# Patient Record
Sex: FEMALE | URBAN - METROPOLITAN AREA
[De-identification: names, ages, dates, MRNs, and addresses within clinical notes are randomized per-mention and may not be internally consistent; named-entity substitution may affect disease eponyms.]

---

## 2021-12-08 ENCOUNTER — APPOINTMENT (RX ONLY)
Dept: URBAN - METROPOLITAN AREA CLINIC 148 | Facility: CLINIC | Age: 46
Setting detail: DERMATOLOGY
End: 2021-12-08

## 2021-12-08 DIAGNOSIS — Z41.9 ENCOUNTER FOR PROCEDURE FOR PURPOSES OTHER THAN REMEDYING HEALTH STATE, UNSPECIFIED: ICD-10-CM

## 2021-12-08 DIAGNOSIS — L98.8 OTHER SPECIFIED DISORDERS OF THE SKIN AND SUBCUTANEOUS TISSUE: ICD-10-CM

## 2021-12-08 PROCEDURE — ? BOTOX (U OR CC)

## 2021-12-08 PROCEDURE — ? COSMETIC CONSULTATION: BOTOX

## 2021-12-08 PROCEDURE — ? ADDITIONAL NOTES

## 2021-12-08 NOTE — HPI: COSMETIC (BOTOX)
Have You Had Botox Before?: has had botox
When Was Your Last Botox Treatment?: 12/2020
Additional History: Patient has had Botox in the past multiple times, last treatment was around a year ago. Patient reports one time she was treated the ends of her eyebrows lifted which she did not like and one time she experienced a heavy brow. All of which resolved with retreatment and or time. Patient reports she is currently taking Valtrex for oral herpes sore.

## 2021-12-08 NOTE — PROCEDURE: ADDITIONAL NOTES
Additional Notes: Holiday Event pricing honored today.
Render Risk Assessment In Note?: no
Detail Level: Simple

## 2021-12-08 NOTE — PROCEDURE: BOTOX (U OR CC)
Post-Care Instructions: Post care instructions reviewed with patient today and all questions answered to patient's satisfaction. Patient provided signed copy of discharge instructions.

## 2021-12-08 NOTE — PROCEDURE: BOTOX (U OR CC)
Consent: Written consent obtained. Risks include but not limited to lid/brow ptosis, bruising, swelling, diplopia, temporary effect, incomplete chemical denervation. Areas prepped with alcohol and chlorhexidine. Areas treated without complication, patient tolerated well.

## 2021-12-29 ENCOUNTER — APPOINTMENT (RX ONLY)
Dept: URBAN - METROPOLITAN AREA CLINIC 148 | Facility: CLINIC | Age: 46
Setting detail: DERMATOLOGY
End: 2021-12-29

## 2021-12-29 DIAGNOSIS — Z41.9 ENCOUNTER FOR PROCEDURE FOR PURPOSES OTHER THAN REMEDYING HEALTH STATE, UNSPECIFIED: ICD-10-CM

## 2021-12-29 DIAGNOSIS — L98.8 OTHER SPECIFIED DISORDERS OF THE SKIN AND SUBCUTANEOUS TISSUE: ICD-10-CM

## 2021-12-29 PROCEDURE — ? ADDITIONAL NOTES

## 2021-12-29 PROCEDURE — ? BOTOX (U OR CC)

## 2021-12-29 PROCEDURE — ? COSMETIC FOLLOW-UP

## 2021-12-29 NOTE — PROCEDURE: BOTOX (U OR CC)
Post-Care Instructions: Post care instructions reviewed with patient today and all questions answered to patient's satisfaction.

## 2021-12-29 NOTE — HPI: COSMETIC FOLLOW UP
How Did You Tolerate The Procedure?: well, without problems
What Condition Are We Treating?: wrinkles
What Procedure Did We Perform At The Last Visit?: Botox on frontalis, glabella and periorbital

## 2021-12-29 NOTE — PROCEDURE: ADDITIONAL NOTES
Additional Notes: Discussed right brow may be stronger than left. Recommend Botox 1 unit to right frontalis to lower right brow arch. Patient verbalizes understanding and would like to proceed with provider recommendation today.

## 2021-12-29 NOTE — PROCEDURE: ADDITIONAL NOTES
Additional Notes: Discussed Botox 6-8 units as possible treatment option for superior perioral rhytides. Patient education provided that perioral Botox can alter the sensation and or ability to move the upper lip, making it difficult to perform tasks such as sipping and that this should be considered acceptable by the patient prior to treatment. Patient reports she will think about this.\\n\\nAlso discussed and provided patient education on micro needling as a possible treatment option for this area. Also discussed Subnovii, but as patient is in the sun often and tans this treatment would not be ideal for her. \\n\\nRecommend follow up with [facility] general dermatology provider for spot on nose. \\n\\nPatient verbalizes understanding and all questions answered to patient satisfaction. Additional Notes: Discussed Botox 6-8 units as possible treatment option for superior perioral rhytides. Patient education provided that perioral Botox can alter the sensation and or ability to move the upper lip, making it difficult to perform tasks such as sipping and that this should be considered acceptable by the patient prior to treatment. Patient reports she will think about this.\\n\\nAlso discussed and provided patient education on micro needling as a possible treatment option for this area. Also discussed Subnovii, but as patient is in the sun often and tans this treatment would not be ideal for her. \\n\\nRecommend follow up with [facility] general dermatology provider for spot on nose. \\n\\nPatient verbalizes understanding and all questions answered to patient satisfaction.

## 2021-12-29 NOTE — PROCEDURE: COSMETIC FOLLOW-UP
Global Improvement: Very Good
Detail Level: Zone
Side Effects Or Complications: None
Side Effects Override (Free Text): right brow raises higher than the left
Treatment Override (Free Text): Botox- glabella
Patient Satisfaction: Very pleased
Global Improvement: Marked
Treatment Override (Free Text): Botox- orbicularis oculi
Treatment Override (Free Text): Botox- frontalis

## 2022-04-06 ENCOUNTER — APPOINTMENT (RX ONLY)
Dept: URBAN - METROPOLITAN AREA CLINIC 148 | Facility: CLINIC | Age: 47
Setting detail: DERMATOLOGY
End: 2022-04-06

## 2022-04-06 DIAGNOSIS — L98.8 OTHER SPECIFIED DISORDERS OF THE SKIN AND SUBCUTANEOUS TISSUE: ICD-10-CM

## 2022-04-06 PROCEDURE — ? BOTOX (U OR CC)

## 2022-04-06 PROCEDURE — ? ADDITIONAL NOTES

## 2022-04-06 NOTE — HPI: COSMETIC (BOTOX)
Have You Had Botox Before?: has had botox
When Was Your Last Botox Treatment?: 12/21
Additional History: Right brow is naturally higher than left.

## 2022-06-28 ENCOUNTER — APPOINTMENT (RX ONLY)
Dept: URBAN - METROPOLITAN AREA CLINIC 148 | Facility: CLINIC | Age: 47
Setting detail: DERMATOLOGY
End: 2022-06-28

## 2022-06-28 DIAGNOSIS — L98.8 OTHER SPECIFIED DISORDERS OF THE SKIN AND SUBCUTANEOUS TISSUE: ICD-10-CM

## 2022-06-28 PROCEDURE — ? BOTOX

## 2022-06-28 PROCEDURE — ? OTHER

## 2022-06-28 NOTE — PROCEDURE: OTHER
Detail Level: Detailed
Note Text (......Xxx Chief Complaint.): This diagnosis correlates with the
Other (Free Text): The right mid to lower forehead received two extra units at the expense of the left forehead to make up for the increased amount of wrinkles on the right
Render Risk Assessment In Note?: no

## 2022-06-28 NOTE — PROCEDURE: BOTOX
Price (Use Numbers Only, No Special Characters Or $): 654 Price (Use Numbers Only, No Special Characters Or $): 280

## 2022-06-28 NOTE — PROCEDURE: BOTOX
Consent: Written / verbal consent obtained. Risks include but not limited to lid/brow ptosis, bruising, swelling, diplopia, temporary effect, incomplete chemical denervation

## 2022-09-19 ENCOUNTER — APPOINTMENT (RX ONLY)
Dept: URBAN - METROPOLITAN AREA CLINIC 148 | Facility: CLINIC | Age: 47
Setting detail: DERMATOLOGY
End: 2022-09-19

## 2022-09-19 DIAGNOSIS — Z02.9 ENCOUNTER FOR ADMINISTRATIVE EXAMINATIONS, UNSPECIFIED: ICD-10-CM

## 2022-09-19 DIAGNOSIS — L98.8 OTHER SPECIFIED DISORDERS OF THE SKIN AND SUBCUTANEOUS TISSUE: ICD-10-CM

## 2022-09-19 PROCEDURE — ? ADDITIONAL NOTES

## 2022-09-19 PROCEDURE — ? BOTOX

## 2022-09-19 NOTE — PROCEDURE: BOTOX
Price (Use Numbers Only, No Special Characters Or $): 162 Price (Use Numbers Only, No Special Characters Or $): 697

## 2022-11-03 ENCOUNTER — APPOINTMENT (RX ONLY)
Dept: URBAN - METROPOLITAN AREA CLINIC 148 | Facility: CLINIC | Age: 47
Setting detail: DERMATOLOGY
End: 2022-11-03

## 2022-11-03 DIAGNOSIS — L98.8 OTHER SPECIFIED DISORDERS OF THE SKIN AND SUBCUTANEOUS TISSUE: ICD-10-CM

## 2022-11-03 PROCEDURE — ? BOTOX

## 2022-11-03 ASSESSMENT — LOCATION SIMPLE DESCRIPTION DERM: LOCATION SIMPLE: GLABELLA

## 2022-11-03 ASSESSMENT — LOCATION DETAILED DESCRIPTION DERM: LOCATION DETAILED: GLABELLA

## 2022-11-03 ASSESSMENT — LOCATION ZONE DERM: LOCATION ZONE: FACE

## 2022-11-03 NOTE — PROCEDURE: BOTOX
Consent: Written / verbal consent obtained. Risks include but not limited to lid/brow ptosis, bruising, swelling, diplopia, temporary effect, incomplete chemical denervation. Areas cleansed with alcohol prior to injection. Areas injected without complication, patient tolerated well.

## 2022-11-03 NOTE — PROCEDURE: BOTOX
Post-Care Instructions: Post care instructions reviewed with patient. Patient declines printed copy.

## 2022-12-19 ENCOUNTER — APPOINTMENT (RX ONLY)
Dept: URBAN - METROPOLITAN AREA CLINIC 148 | Facility: CLINIC | Age: 47
Setting detail: DERMATOLOGY
End: 2022-12-19

## 2022-12-19 DIAGNOSIS — L98.8 OTHER SPECIFIED DISORDERS OF THE SKIN AND SUBCUTANEOUS TISSUE: ICD-10-CM

## 2022-12-19 PROCEDURE — ? BOTOX

## 2022-12-19 NOTE — PROCEDURE: BOTOX
Price (Use Numbers Only, No Special Characters Or $): 599 Price (Use Numbers Only, No Special Characters Or $): 439

## 2023-02-20 ENCOUNTER — APPOINTMENT (RX ONLY)
Dept: URBAN - METROPOLITAN AREA CLINIC 333 | Facility: CLINIC | Age: 48
Setting detail: DERMATOLOGY
End: 2023-02-20

## 2023-02-20 DIAGNOSIS — L98.8 OTHER SPECIFIED DISORDERS OF THE SKIN AND SUBCUTANEOUS TISSUE: ICD-10-CM

## 2023-02-20 PROCEDURE — ? BOTOX

## 2023-02-20 ASSESSMENT — LOCATION DETAILED DESCRIPTION DERM
LOCATION DETAILED: LEFT FOREHEAD
LOCATION DETAILED: GLABELLA
LOCATION DETAILED: RIGHT FOREHEAD

## 2023-02-20 ASSESSMENT — LOCATION SIMPLE DESCRIPTION DERM
LOCATION SIMPLE: RIGHT FOREHEAD
LOCATION SIMPLE: GLABELLA
LOCATION SIMPLE: LEFT FOREHEAD

## 2023-02-20 ASSESSMENT — LOCATION ZONE DERM: LOCATION ZONE: FACE

## 2023-02-20 NOTE — PROCEDURE: BOTOX
Patient Specific Comments (Will Not Stick From Patient To Patient): For the purpose of clarification, and because different concentrations are used in different offices, the 100 unit vial was reconstituted with 2.5 cc of dilutant, and a total of .5 cc was injected for the glabella, and .5 cc for the forehead for a total of 40 units in two locations.

## 2023-02-20 NOTE — PROCEDURE: BOTOX
Price (Use Numbers Only, No Special Characters Or $): 191 Price (Use Numbers Only, No Special Characters Or $): 431

## 2023-04-19 ENCOUNTER — APPOINTMENT (RX ONLY)
Dept: URBAN - METROPOLITAN AREA CLINIC 333 | Facility: CLINIC | Age: 48
Setting detail: DERMATOLOGY
End: 2023-04-19

## 2023-04-19 DIAGNOSIS — L98.8 OTHER SPECIFIED DISORDERS OF THE SKIN AND SUBCUTANEOUS TISSUE: ICD-10-CM

## 2023-04-19 PROCEDURE — ? BOTOX

## 2023-04-19 ASSESSMENT — LOCATION ZONE DERM: LOCATION ZONE: FACE

## 2023-04-19 ASSESSMENT — LOCATION DETAILED DESCRIPTION DERM
LOCATION DETAILED: LEFT FOREHEAD
LOCATION DETAILED: RIGHT FOREHEAD
LOCATION DETAILED: GLABELLA

## 2023-04-19 ASSESSMENT — LOCATION SIMPLE DESCRIPTION DERM
LOCATION SIMPLE: GLABELLA
LOCATION SIMPLE: RIGHT FOREHEAD
LOCATION SIMPLE: LEFT FOREHEAD

## 2023-04-19 NOTE — PROCEDURE: BOTOX
Price (Use Numbers Only, No Special Characters Or $): 397 Price (Use Numbers Only, No Special Characters Or $): 855